# Patient Record
Sex: FEMALE | URBAN - METROPOLITAN AREA
[De-identification: names, ages, dates, MRNs, and addresses within clinical notes are randomized per-mention and may not be internally consistent; named-entity substitution may affect disease eponyms.]

---

## 2017-06-28 ENCOUNTER — APPOINTMENT (RX ONLY)
Dept: URBAN - METROPOLITAN AREA CLINIC 15 | Facility: CLINIC | Age: 37
Setting detail: DERMATOLOGY
End: 2017-06-28

## 2017-06-28 DIAGNOSIS — Z41.9 ENCOUNTER FOR PROCEDURE FOR PURPOSES OTHER THAN REMEDYING HEALTH STATE, UNSPECIFIED: ICD-10-CM

## 2017-06-28 PROCEDURE — ? COOLSCULPTING

## 2017-06-28 PROCEDURE — ? ADDITIONAL NOTES

## 2017-06-28 NOTE — PROCEDURE: ADDITIONAL NOTES
Additional Notes: CoolSculpting treatment prices:\\nArms: cool petite x2 $1200\\nLower flanks: cooladvantage 6.4 x 2 $1200

## 2017-06-28 NOTE — PROCEDURE: COOLSCULPTING
Consent: Written consent obtained, risks reviewed including but not limited to blistering from suction, darker or lighter pigmentary change, bruising, and/or need for multiple treatments.
Post Treatment: After treatment, the suction was turned off, and the applicator was removed from the skin. \\nA massage of 2 minutes of duration was performed area immediately after removing the applicators. \\nTreatment was well tolerated without complications. \\n\\nPatient reported 0 pain by the time of leaving. The area had mild erythema; it was numbed and had no bruising, blanching or signs of epidermal damage. \\nI gave home instructions with our phone and email contacts and I suggested drinking a lot of water, avoiding anti-inflammatories for 2 weeks (except Tylenol), Vitamin D3 3,000 UI, Vitamin C 1,000 mg and Zinc 50 mg once a day for 3 months and follow up in 1 month.
Post-Care Instructions: I reviewed with the patient in detail post-care instructions. Patient should stay away from the sun and wear sun protection until treated areas are fully healed.
Intro: Patient was here for CoolSculpting treatment on the lower flanks and arms . \\nPatient signed the consent form, photo release form and had the opportunity to ask questions. Baseline pictures were taken. \\nWeight: 137 lb. \\n\\nPrior to treatment, the area was cleaned with alcohol and marked out with a marking pen. The gel sheet was then applied uniformly. The applicator was applied to the skin with good contact and suction.
Device: ArthroCAD
Detail Level: Zone
Price (Use Numbers Only, No Special Characters Or $): 0
Indication: non-invasive fat removal

## 2017-09-26 ENCOUNTER — APPOINTMENT (RX ONLY)
Dept: URBAN - METROPOLITAN AREA CLINIC 15 | Facility: CLINIC | Age: 37
Setting detail: DERMATOLOGY
End: 2017-09-26

## 2017-09-26 DIAGNOSIS — Z41.9 ENCOUNTER FOR PROCEDURE FOR PURPOSES OTHER THAN REMEDYING HEALTH STATE, UNSPECIFIED: ICD-10-CM

## 2017-09-26 PROCEDURE — ? ADDITIONAL NOTES

## 2017-09-26 PROCEDURE — ? COOLSCULPTING

## 2017-09-26 NOTE — PROCEDURE: COOLSCULPTING
Post-Care Instructions: I reviewed with the patient in detail post-care instructions. Patient should stay away from the sun and wear sun protection until treated areas are fully healed.
Indication: non-invasive fat removal
Intro: Patient was here for CoolSculpting treatment on the lower abdomen area. \\nPatient signed the consent form. Baseline pictures were taken. \\nPatient refused to be weighted and signed a photo refusal form. \\nPrior to treatment, the area was cleaned with alcohol (pretreatment skin wipes) and marked out with a marking pen. The gel sheet was then applied uniformly. The applicator was applied to the skin with good contact and suction.
Price (Use Numbers Only, No Special Characters Or $): 0
Consent: Written consent obtained, risks reviewed including but not limited to blistering from suction, darker or lighter pigmentary change, bruising, and/or need for multiple treatments.
Detail Level: Zone
Post Treatment: After treatment, the suction was turned off, and the applicator was removed from the skin. \\nA massage of 2 minutes of duration was performed area immediately after removing the applicators. \\nTreatment was well tolerated without complications. \\n\\nPatient reported 0 pain by the time of leaving. The area had mild erythema; it was numbed and had no bruising, blanching or signs of epidermal damage. \\nI gave home instructions with our phone and email contacts and I suggested drinking a lot of water, avoiding anti-inflammatories for 2 weeks (except Tylenol), Vitamin D3 3,000 UI, Vitamin C 1,000 mg and Zinc 50 mg once a day for 3 months and follow up in 1 month.

## 2017-10-12 ENCOUNTER — APPOINTMENT (RX ONLY)
Dept: URBAN - METROPOLITAN AREA CLINIC 15 | Facility: CLINIC | Age: 37
Setting detail: DERMATOLOGY
End: 2017-10-12

## 2017-10-12 DIAGNOSIS — Z41.9 ENCOUNTER FOR PROCEDURE FOR PURPOSES OTHER THAN REMEDYING HEALTH STATE, UNSPECIFIED: ICD-10-CM

## 2017-10-12 PROCEDURE — ? BOTOX

## 2017-10-12 NOTE — PROCEDURE: BOTOX
Additional Area 6 Units: 0
Lot #: [id number]
Dilution (U/0.1 Cc): 2.5
Consent: Written consent obtained. Risks include but not limited to lid/brow ptosis, bruising, swelling, diplopia, temporary effect, incomplete chemical denervation.
Forehead Units: 12.5
Periorbital Skin Units: [address] [facility]
Glabellar Complex Units: 10
Detail Level: Simple
Post-Care Instructions: Patient instructed to not lie down for 4 hours and limit physical activity for 24 hours. Patient instructed not to travel by airplane for 48 hours.
Expiration Date (Month Year): 4/20

## 2017-10-13 ENCOUNTER — APPOINTMENT (RX ONLY)
Dept: URBAN - METROPOLITAN AREA CLINIC 15 | Facility: CLINIC | Age: 37
Setting detail: DERMATOLOGY
End: 2017-10-13

## 2017-10-13 DIAGNOSIS — Z41.9 ENCOUNTER FOR PROCEDURE FOR PURPOSES OTHER THAN REMEDYING HEALTH STATE, UNSPECIFIED: ICD-10-CM

## 2017-10-13 PROCEDURE — ? COOLSCULPTING

## 2017-10-13 ASSESSMENT — LOCATION DETAILED DESCRIPTION DERM: LOCATION DETAILED: PERIUMBILICAL SKIN

## 2017-10-13 ASSESSMENT — LOCATION ZONE DERM: LOCATION ZONE: TRUNK

## 2017-10-13 ASSESSMENT — LOCATION SIMPLE DESCRIPTION DERM: LOCATION SIMPLE: ABDOMEN

## 2017-10-13 NOTE — PROCEDURE: COOLSCULPTING
Post Treatment: After treatment, the suction was turned off, and the applicator was removed from the skin. A massage was performed for 2 minutes. Patient was instructed to follow a healthy diet, perform regular exercise and take a multivitamin and a vitamin D3 supplement daily for 3 months. Also, patient was instructed not to take any anti-inflammatories for 2 weeks.
Post-Care Instructions: I reviewed with the patient in detail post-care instructions. Patient should stay away from the sun and wear sun protection until treated areas are fully healed.
Detail Level: Zone
Indication: non-invasive fat removal
Device: Zeltiq CoolSculpt
Intro: Prior to treatment, the area was cleaned with alcohol and marked out with a marking pen. The gel sheet was then applied uniformly. The applicator was applied to the skin with good contact and suction.
Consent: Written consent obtained, risks reviewed including but not limited to blistering from suction, darker or lighter pigmentary change, bruising, and/or need for multiple treatments.
Price (Use Numbers Only, No Special Characters Or $): [address] 
Location: mid abdomen (right)
Applicators: CoolAdvantage Core
Suction Settings: The suction settings were per protocol.
Time (Minutes): [address]
Time (Minutes): 35
Location: mid abdomen (left)

## 2017-11-07 ENCOUNTER — APPOINTMENT (RX ONLY)
Dept: URBAN - METROPOLITAN AREA CLINIC 15 | Facility: CLINIC | Age: 37
Setting detail: DERMATOLOGY
End: 2017-11-07

## 2017-11-07 DIAGNOSIS — Z41.9 ENCOUNTER FOR PROCEDURE FOR PURPOSES OTHER THAN REMEDYING HEALTH STATE, UNSPECIFIED: ICD-10-CM

## 2017-11-07 PROCEDURE — ? BOTOX

## 2017-11-07 NOTE — PROCEDURE: BOTOX
Anterior Platysmal Bands Units: 0
Post-Care Instructions: Patient instructed to not lie down for 4 hours and limit physical activity for 24 hours. Patient instructed not to travel by airplane for 48 hours.
Expiration Date (Month Year): 4/2020
Lot #: [id number]
Additional Area 1 Location: chin
Detail Level: Simple
Consent: Written consent obtained. Risks include but not limited to lid/brow ptosis, bruising, swelling, diplopia, temporary effect, incomplete chemical denervation.
Dilution (U/0.1 Cc): 2.5

## 2018-03-28 ENCOUNTER — APPOINTMENT (RX ONLY)
Dept: URBAN - METROPOLITAN AREA CLINIC 15 | Facility: CLINIC | Age: 38
Setting detail: DERMATOLOGY
End: 2018-03-28

## 2018-03-28 DIAGNOSIS — L57.0 ACTINIC KERATOSIS: ICD-10-CM

## 2018-03-28 DIAGNOSIS — Z41.9 ENCOUNTER FOR PROCEDURE FOR PURPOSES OTHER THAN REMEDYING HEALTH STATE, UNSPECIFIED: ICD-10-CM

## 2018-03-28 PROCEDURE — 99212 OFFICE O/P EST SF 10 MIN: CPT

## 2018-03-28 PROCEDURE — ? COUNSELING

## 2018-03-28 PROCEDURE — ? BOTOX

## 2018-03-28 PROCEDURE — ? ADDITIONAL NOTES

## 2018-03-28 ASSESSMENT — LOCATION SIMPLE DESCRIPTION DERM: LOCATION SIMPLE: RIGHT CHEEK

## 2018-03-28 ASSESSMENT — LOCATION DETAILED DESCRIPTION DERM: LOCATION DETAILED: RIGHT MEDIAL MALAR CHEEK

## 2018-03-28 ASSESSMENT — LOCATION ZONE DERM: LOCATION ZONE: FACE

## 2018-03-28 NOTE — HPI: COSMETIC (BOTOX)
Have You Had Botox Before?: has had botox
Additional History: \\n\\n3- areas 680
When Was Your Last Botox Treatment?: 11/2017

## 2018-03-28 NOTE — PROCEDURE: ADDITIONAL NOTES
Additional Notes: Patient deferred treatment today, Patient states she will come back after summer for treatment.

## 2018-03-28 NOTE — PROCEDURE: BOTOX
Consent: Written consent obtained. Risks include but not limited to lid/brow ptosis, bruising, swelling, diplopia, temporary effect, incomplete chemical denervation.
Lot #: [id number]
Additional Area 2 Units: 0
Glabellar Complex Units: 15
Dilution (U/0.1 Cc): 2.5
Detail Level: Detailed
Periorbital Skin Units: [address] [facility]
Expiration Date (Month Year): 09/2020
Post-Care Instructions: Patient instructed to not lie down for 4 hours and limit physical activity for 24 hours. Patient instructed not to travel by airplane for 48 hours.

## 2019-08-31 NOTE — HPI: SKIN LESION
How Severe Is Your Skin Lesion?: mild
Has Your Skin Lesion Been Treated?: not been treated
Is This A New Presentation, Or A Follow-Up?: Skin Lesion
Which Family Member (Optional)?: Grandmother
2 person assist/set-up required